# Patient Record
(demographics unavailable — no encounter records)

---

## 2025-03-04 NOTE — HISTORY OF PRESENT ILLNESS
[FreeTextEntry1] : Patient is an 82 year old F with history of CHF, CAD, HTN, was admitted to [facility] [place] last month, had ATN / CRS requiring hemodialysis. Patient was discharged on dialysis at [facility], with downtrending weekly Creatinines. Patient has now been off HD for a week, Cr stable 2.2. Permacath still in place. Labs to be done today. Being maintained on lasix 80mg daily at [facility] and taking sodium bicarb 650mg TID.

## 2025-03-04 NOTE — PHYSICAL EXAM
[General Appearance - Alert] : alert [General Appearance - In No Acute Distress] : in no acute distress [Neck Appearance] : the appearance of the neck was normal [Neck Cervical Mass (___cm)] : no neck mass was observed [Jugular Venous Distention Increased] : there was no jugular-venous distention [Thyroid Diffuse Enlargement] : the thyroid was not enlarged [Thyroid Nodule] : there were no palpable thyroid nodules [Auscultation Breath Sounds / Voice Sounds] : lungs were clear to auscultation bilaterally [Heart Rate And Rhythm] : heart rate was normal and rhythm regular [Heart Sounds] : normal S1 and S2 [Heart Sounds Gallop] : no gallops [Murmurs] : no murmurs [Heart Sounds Pericardial Friction Rub] : no pericardial rub [Full Pulse] : the pedal pulses are present [Edema] : there was no peripheral edema [FreeTextEntry1] : tunneled CVC RIJ permacath [Bowel Sounds] : normal bowel sounds [Abdomen Soft] : soft [Abdomen Tenderness] : non-tender [Abdomen Mass (___ Cm)] : no abdominal mass palpated [Cervical Lymph Nodes Enlarged Posterior Bilaterally] : posterior cervical [Cervical Lymph Nodes Enlarged Anterior Bilaterally] : anterior cervical [Supraclavicular Lymph Nodes Enlarged Bilaterally] : supraclavicular [Axillary Lymph Nodes Enlarged Bilaterally] : axillary [Femoral Lymph Nodes Enlarged Bilaterally] : femoral [Inguinal Lymph Nodes Enlarged Bilaterally] : inguinal [No CVA Tenderness] : no ~M costovertebral angle tenderness [No Spinal Tenderness] : no spinal tenderness [Abnormal Walk] : normal gait [Nail Clubbing] : no clubbing  or cyanosis of the fingernails [Musculoskeletal - Swelling] : no joint swelling seen [Motor Tone] : muscle strength and tone were normal [Skin Color & Pigmentation] : normal skin color and pigmentation [Skin Turgor] : normal skin turgor [] : no rash [Deep Tendon Reflexes (DTR)] : deep tendon reflexes were 2+ and symmetric [Sensation] : the sensory exam was normal to light touch and pinprick [No Focal Deficits] : no focal deficits [Oriented To Time, Place, And Person] : oriented to person, place, and time [Impaired Insight] : insight and judgment were intact [Affect] : the affect was normal

## 2025-03-04 NOTE — ASSESSMENT
[FreeTextEntry1] : 1) ATN 2) Cardiorenal syndrome 3) CKD IV 4) HTN  Maintain on lasix 80mg daily Maintain on sodium bicarb 650mg TID Will get labs today RTC 2 weeks with Penny Shields NP

## 2025-03-14 NOTE — PROCEDURE
[FreeTextEntry1] : right IJ perm cath removal  [FreeTextEntry2] : right IJ perm cath removal  [FreeTextEntry3] :  perm cath removal:  pt brought into exam room. Explained procedure and got written and verbal consent. Right chest prepped and draped in normal sterile fashion. 5 mL of 1% lidocaine injected around cuff of perm cath. Sutures removed from perm cath. Perm cath removed with manual traction. Pressure held for 5 minutes. Sterile gauze and tegaderm applied to site of perm cath. Pt tolerated procedure well.

## 2025-04-10 NOTE — HISTORY OF PRESENT ILLNESS
[FreeTextEntry1] : Patient is an 82 year old F with history of CHF, CAD, HTN, was [place] [facility] [place], had ATN / CRS requiring hemodialysis. Patient was discharged on dialysis at [facility], with downtrending weekly Creatinines. Patient has now been off HD for a week, Cr stable 2.2. Permacath still in place. Labs to be done today. Being maintained on lasix 80mg daily at [facility] and taking sodium bicarb 650mg TID.  Current: Pt seen/examined; hypotensive in office ; dry mucus membranes; labs reviewed BUN 48-->129; Cr went from 2 to 3.2; stopped lasix and entresto yesterday; was refusing ER

## 2025-04-10 NOTE — PHYSICAL EXAM
[General Appearance - Alert] : alert [General Appearance - In No Acute Distress] : in no acute distress [Neck Appearance] : the appearance of the neck was normal [Neck Cervical Mass (___cm)] : no neck mass was observed [Jugular Venous Distention Increased] : there was no jugular-venous distention [Thyroid Diffuse Enlargement] : the thyroid was not enlarged [Thyroid Nodule] : there were no palpable thyroid nodules [Auscultation Breath Sounds / Voice Sounds] : lungs were clear to auscultation bilaterally [Heart Rate And Rhythm] : heart rate was normal and rhythm regular [Heart Sounds] : normal S1 and S2 [Heart Sounds Gallop] : no gallops [Murmurs] : no murmurs [Heart Sounds Pericardial Friction Rub] : no pericardial rub [Full Pulse] : the pedal pulses are present [Edema] : there was no peripheral edema [Bowel Sounds] : normal bowel sounds [Abdomen Soft] : soft [Abdomen Tenderness] : non-tender [Abdomen Mass (___ Cm)] : no abdominal mass palpated [Cervical Lymph Nodes Enlarged Posterior Bilaterally] : posterior cervical [Cervical Lymph Nodes Enlarged Anterior Bilaterally] : anterior cervical [Supraclavicular Lymph Nodes Enlarged Bilaterally] : supraclavicular [Axillary Lymph Nodes Enlarged Bilaterally] : axillary [Femoral Lymph Nodes Enlarged Bilaterally] : femoral [Inguinal Lymph Nodes Enlarged Bilaterally] : inguinal [No CVA Tenderness] : no ~M costovertebral angle tenderness [No Spinal Tenderness] : no spinal tenderness [Abnormal Walk] : normal gait [Nail Clubbing] : no clubbing  or cyanosis of the fingernails [Musculoskeletal - Swelling] : no joint swelling seen [Motor Tone] : muscle strength and tone were normal [Skin Color & Pigmentation] : normal skin color and pigmentation [Skin Turgor] : normal skin turgor [] : no rash [Deep Tendon Reflexes (DTR)] : deep tendon reflexes were 2+ and symmetric [Sensation] : the sensory exam was normal to light touch and pinprick [No Focal Deficits] : no focal deficits [Oriented To Time, Place, And Person] : oriented to person, place, and time [Impaired Insight] : insight and judgment were intact [Affect] : the affect was normal [FreeTextEntry1] : tunneled CVC RIJ permacath

## 2025-04-10 NOTE — ASSESSMENT
[FreeTextEntry1] : 1) ATN 2) Cardiorenal syndrome 3) CKD IV 4) HTN  Convinced patient to go to ER Hypotensive; Needs IVF Signed out to Penny Shields NP who is at hospital Gave note to  to give to ER doc  RTC 2 weeks

## 2025-04-28 NOTE — DISCUSSION/SUMMARY
[Patient] : the patient [With Me] : with me [___ Month(s)] : in [unfilled] month(s) [FreeTextEntry1] : 82-year-old female past medical history of above presenting for hospital follow-up.  Worsening renal function as well as anemia.  Following up with nephrology and PMD.  GI did not recommend any intervention.  1. CAD - s/p PCI to the LAD complicated by distal vessel dissection - healed.  No chest pain. 2. HTN -now hypotensive.  Asymptomatic.  No longer on Lasix or Entresto.  Metoprolol was changed to Coreg 3.125 twice daily.  Continue same. 3. Palpitations - NSVT and PAT -continue beta-blocker. 4. Smoking cessation advised.   5. Cardiac rehab referral will be given by Dr. Shrestha.  6. PAD - aortic aneurysm -status postrepair.  SFA stenosis - Maini evaluation for revasc.  7.  CKD: Continue follow-up with nephrology. 8.  Anemia: History of large untreated polyp as per hospital records.  Continue follow-up with GI.  RTC 2-3 months

## 2025-04-28 NOTE — HISTORY OF PRESENT ILLNESS
[FreeTextEntry1] : 82F w/ PMH of HTN, HLD, CAD s/p cath with diagonal disease (too small to fix), lung CA, breast CA, active smoker and palpitations presenting to re-establish care.  She had SARSCOV2 along with her  and has felt pretty much back to normal.  She experiences left-sided chest pain/heaviness, rated 6/10 and lasting for minutes at a time. She has also been experiencing palpitations.   8/9/2024: She reports a possible pancreatic mass (I do not have the results of her [place] [facility] testing).  Stable SOB, cancelled her vascular cardiology appts and studies.   4/25/25: Presents today for follow-up from recent hospital admission.  Was seen at her nephrologist office and found to be hypotensive at 72/48 mmHg.  There was also a significant increase in her renal function.  BUN increased from .  Creatinine went from 2-3.2.  Patient had discontinued Lasix and Entresto the day prior.  Upon presentation to ED, patient's H&H was 6.1 and 20.9 respectively.  She was transfused 2 units and given Retacrit.  H&H improved.  Evaluated by GI with no recommended interventions at that time.  Has history of "Large polyp".  CT scan was not done and follow-up with GI advised.  Entresto and Lasix were held and not resumed on discharge.  Metoprolol was changed to Coreg.  She was evaluated by palliative care.  DNR and DNI signed.  Patient was asymptomatic and subsequently discharged home.  No hospice.  Prior to discharge, patient's BUN improved to 74 and creatinine to 1.7.  Notes that she had blood work 2 days ago with her PMD.  These will be requested. She is seen today in a wheelchair.  Overall feels well but weak with continued but improving dyspnea on exertion..  She is requesting cardiac rehab.  Denies any current chest pain.  No edema PND or orthopnea.

## 2025-04-28 NOTE — PHYSICAL EXAM
[Well Developed] : well developed [No Acute Distress] : no acute distress [Frail] : frail [Normal Conjunctiva] : normal conjunctiva [Normal Venous Pressure] : normal venous pressure [No Carotid Bruit] : no carotid bruit [Normal S1, S2] : normal S1, S2 [No Murmur] : no murmur [No Rub] : no rub [No Gallop] : no gallop [No Respiratory Distress] : no respiratory distress  [Soft] : abdomen soft [Non Tender] : non-tender [No Masses/organomegaly] : no masses/organomegaly [Normal Bowel Sounds] : normal bowel sounds [Abnormal Gait] : abnormal gait [No Edema] : no edema [No Cyanosis] : no cyanosis [No Clubbing] : no clubbing [No Varicosities] : no varicosities [No Rash] : no rash [No Skin Lesions] : no skin lesions [Moves all extremities] : moves all extremities [No Focal Deficits] : no focal deficits [Normal Speech] : normal speech [Alert and Oriented] : alert and oriented [Normal memory] : normal memory [de-identified] : Poor airy entry, rhonchi [de-identified] : broken foot, in wheelchair [de-identified] : poor distal pulses

## 2025-04-28 NOTE — CARDIOLOGY SUMMARY
[de-identified] : 3/26/21: SR, LVH, ST changes associated with LVH\par  1/10/22: SR, NSST, LVH\par  9/26/2022: Sinus rhythm, LVH, NSST\par  4/28/2023: SR, PVC, LVH, NSST, unchanged\par  6/12/2023: SR, LVH, NSST [de-identified] : 3/26/21: EF 50-55%, minimal MR, trace AI, severely dilated LA, hypokinesis of the basal inferior wall, basal inferolateral wall and basal lateral wall, mild concentric LVH, mild PI, mild pHTN. \par  10/11/22: Ef reduced, left atrial enlargement.  [de-identified] : 4/1/21: mLAD 0% stenosis of prior stent, distal LAD 70%, healed dissection, D1, 90% stenosis in small vessel.

## 2025-04-28 NOTE — CARDIOLOGY SUMMARY
[de-identified] : 3/26/21: SR, LVH, ST changes associated with LVH\par  1/10/22: SR, NSST, LVH\par  9/26/2022: Sinus rhythm, LVH, NSST\par  4/28/2023: SR, PVC, LVH, NSST, unchanged\par  6/12/2023: SR, LVH, NSST [de-identified] : 3/26/21: EF 50-55%, minimal MR, trace AI, severely dilated LA, hypokinesis of the basal inferior wall, basal inferolateral wall and basal lateral wall, mild concentric LVH, mild PI, mild pHTN. \par  10/11/22: Ef reduced, left atrial enlargement.  [de-identified] : 4/1/21: mLAD 0% stenosis of prior stent, distal LAD 70%, healed dissection, D1, 90% stenosis in small vessel.

## 2025-04-28 NOTE — CARDIOLOGY SUMMARY
[de-identified] : 3/26/21: SR, LVH, ST changes associated with LVH\par  1/10/22: SR, NSST, LVH\par  9/26/2022: Sinus rhythm, LVH, NSST\par  4/28/2023: SR, PVC, LVH, NSST, unchanged\par  6/12/2023: SR, LVH, NSST [de-identified] : 3/26/21: EF 50-55%, minimal MR, trace AI, severely dilated LA, hypokinesis of the basal inferior wall, basal inferolateral wall and basal lateral wall, mild concentric LVH, mild PI, mild pHTN. \par  10/11/22: Ef reduced, left atrial enlargement.  [de-identified] : 4/1/21: mLAD 0% stenosis of prior stent, distal LAD 70%, healed dissection, D1, 90% stenosis in small vessel.

## 2025-04-28 NOTE — PHYSICAL EXAM
[Well Developed] : well developed [No Acute Distress] : no acute distress [Frail] : frail [Normal Conjunctiva] : normal conjunctiva [Normal Venous Pressure] : normal venous pressure [No Carotid Bruit] : no carotid bruit [Normal S1, S2] : normal S1, S2 [No Murmur] : no murmur [No Rub] : no rub [No Gallop] : no gallop [No Respiratory Distress] : no respiratory distress  [Soft] : abdomen soft [Non Tender] : non-tender [No Masses/organomegaly] : no masses/organomegaly [Normal Bowel Sounds] : normal bowel sounds [Abnormal Gait] : abnormal gait [No Edema] : no edema [No Cyanosis] : no cyanosis [No Clubbing] : no clubbing [No Varicosities] : no varicosities [No Rash] : no rash [No Skin Lesions] : no skin lesions [Moves all extremities] : moves all extremities [No Focal Deficits] : no focal deficits [Normal Speech] : normal speech [Alert and Oriented] : alert and oriented [Normal memory] : normal memory [de-identified] : Poor airy entry, rhonchi [de-identified] : broken foot, in wheelchair [de-identified] : poor distal pulses

## 2025-04-28 NOTE — REVIEW OF SYSTEMS
[Feeling Fatigued] : feeling fatigued [SOB] : shortness of breath [Dyspnea on exertion] : dyspnea during exertion [Leg Claudication] : intermittent leg claudication [Joint Pain] : joint pain [Negative] : Heme/Lymph [Chest Discomfort] : no chest discomfort [Cough] : no cough

## 2025-04-28 NOTE — PHYSICAL EXAM
[Well Developed] : well developed [No Acute Distress] : no acute distress [Frail] : frail [Normal Conjunctiva] : normal conjunctiva [Normal Venous Pressure] : normal venous pressure [No Carotid Bruit] : no carotid bruit [Normal S1, S2] : normal S1, S2 [No Murmur] : no murmur [No Rub] : no rub [No Gallop] : no gallop [No Respiratory Distress] : no respiratory distress  [Soft] : abdomen soft [Non Tender] : non-tender [No Masses/organomegaly] : no masses/organomegaly [Normal Bowel Sounds] : normal bowel sounds [Abnormal Gait] : abnormal gait [No Edema] : no edema [No Cyanosis] : no cyanosis [No Clubbing] : no clubbing [No Varicosities] : no varicosities [No Rash] : no rash [No Skin Lesions] : no skin lesions [Moves all extremities] : moves all extremities [No Focal Deficits] : no focal deficits [Normal Speech] : normal speech [Alert and Oriented] : alert and oriented [Normal memory] : normal memory [de-identified] : Poor airy entry, rhonchi [de-identified] : broken foot, in wheelchair [de-identified] : poor distal pulses

## 2025-04-28 NOTE — REASON FOR VISIT
[Hyperlipidemia] : hyperlipidemia [Hypertension] : hypertension [Coronary Artery Disease] : coronary artery disease [Spouse] : spouse [FreeTextEntry3] : Dr. Davidson

## 2025-04-30 NOTE — ASSESSMENT
[FreeTextEntry1] : 1) ATN 2) Cardiorenal syndrome 3) CKD IV 4) HTN   Pt with a prerenal RUDDY on CKD IV;  SCr returned to pt baseline, 2.25;  SBP soft 90s, clinically looks dry, advised to reduce lasix dosage;  Pt currently taking lasix 20mg po qd, changed to every other day;  Renal panel today;  RTO 1 month;

## 2025-04-30 NOTE — HISTORY OF PRESENT ILLNESS
[FreeTextEntry1] : Patient is an 82 year old F with history of CHF, CAD, HTN, was admitted to [facility] last month, had ATN / CRS requiring hemodialysis. Patient was discharged on dialysis at [facility], with downtrending weekly Creatinines. Patient has now been off HD for a week, Cr stable 2.2. Permacath still in place. Labs to be done today. Being maintained on lasix 80mg daily at [facility] and taking sodium bicarb 650mg TID.  Current: Pt seen/examined; hypotensive in office ; dry mucus membranes; labs reviewed BUN 48-->129; Cr went from 2 to 3.2; stopped lasix and entresto yesterday; was refusing ER  Current: pt seen/examined; soft BP in office, SBP 90s, dry mucus membranes, shriveled legs, BUN/Cr returned to baseline at stopping lasix and entresto, now 52.3/2.25; no need for HD, renal indices are stable;

## 2025-06-04 NOTE — HISTORY OF PRESENT ILLNESS
[FreeTextEntry1] : Patient is an 82 year old F with history of CHF, CAD, HTN, was admitted to [facility] last month, had ATN / CRS requiring hemodialysis. Patient was discharged on dialysis at [facility], with downtrending weekly Creatinine. Patient has now been off HD for a week, Cr stable 2.2. Permacath still in place. Labs to be done today. Being maintained on lasix 80mg daily at [facility] and taking sodium bicarb 650mg TID.  Current: Pt seen/examined; hypotensive in office; dry mucus membranes; labs reviewed BUN 48-->129; Cr went from 2 to 3.2; stopped lasix and entresto yesterday; was refusing ER  4/30/25: pt seen/examined; soft BP in office, SBP 90s, dry mucus membranes, shriveled legs, BUN/Cr returned to baseline at stopping lasix and entresto, now 52.3/2.25; no need for HD, renal indices are stable;   Current: pt seen/examined; pt unsure of all medications, pt  endorses self changing medication regimen for pt. pt is concerned because she is having the continued itching- worried about it being related to kidney issues. Will send labs and have pt return next week to do full medication review, and help pt and  set her daily pill box; Labs BUN/Scr 39/1.83, will send today, however seems unlikely itching is related to uremia;

## 2025-06-04 NOTE — ASSESSMENT
[FreeTextEntry1] : 1) ATN 2) Cardiorenal syndrome 3) CKD IV 4) HTN   Pt with a prerenal RUDDY on CKD IV;  Last BUN/SCr 39/1.83;  Unclear of current pt regimen;  Pt will return to office next week for full medication review;  Send renal panel, urine studies today;  Pt states UOP has significantly reduced, however  has started holding her lasix for approx 1 week; RTO 1 week;

## 2025-06-18 NOTE — PHYSICAL EXAM
[General Appearance - Alert] : alert [General Appearance - In No Acute Distress] : in no acute distress [Neck Appearance] : the appearance of the neck was normal [Neck Cervical Mass (___cm)] : no neck mass was observed [Jugular Venous Distention Increased] : there was no jugular-venous distention [Thyroid Diffuse Enlargement] : the thyroid was not enlarged [Thyroid Nodule] : there were no palpable thyroid nodules [Auscultation Breath Sounds / Voice Sounds] : lungs were clear to auscultation bilaterally [Heart Rate And Rhythm] : heart rate was normal and rhythm regular [Heart Sounds] : normal S1 and S2 [Murmurs] : no murmurs [Heart Sounds Gallop] : no gallops [Heart Sounds Pericardial Friction Rub] : no pericardial rub [Full Pulse] : the pedal pulses are present [Edema] : there was no peripheral edema [FreeTextEntry1] : tunneled CVC RIJ permacath [Bowel Sounds] : normal bowel sounds [Abdomen Soft] : soft [Abdomen Tenderness] : non-tender [Abdomen Mass (___ Cm)] : no abdominal mass palpated [Cervical Lymph Nodes Enlarged Posterior Bilaterally] : posterior cervical [Cervical Lymph Nodes Enlarged Anterior Bilaterally] : anterior cervical [Supraclavicular Lymph Nodes Enlarged Bilaterally] : supraclavicular [Axillary Lymph Nodes Enlarged Bilaterally] : axillary [Femoral Lymph Nodes Enlarged Bilaterally] : femoral [Inguinal Lymph Nodes Enlarged Bilaterally] : inguinal [No CVA Tenderness] : no ~M costovertebral angle tenderness [No Spinal Tenderness] : no spinal tenderness [Abnormal Walk] : normal gait [Nail Clubbing] : no clubbing  or cyanosis of the fingernails [Musculoskeletal - Swelling] : no joint swelling seen [Motor Tone] : muscle strength and tone were normal [Skin Color & Pigmentation] : normal skin color and pigmentation [Skin Turgor] : normal skin turgor [] : no rash [Deep Tendon Reflexes (DTR)] : deep tendon reflexes were 2+ and symmetric [Sensation] : the sensory exam was normal to light touch and pinprick [No Focal Deficits] : no focal deficits [Oriented To Time, Place, And Person] : oriented to person, place, and time [Impaired Insight] : insight and judgment were intact [Affect] : the affect was normal

## 2025-06-18 NOTE — ASSESSMENT
[FreeTextEntry1] : 1) ATN 2) Cardiorenal syndrome 3) CKD IV 4) HTN   Pt with a prerenal RUDDY on CKD IV;  Last BUN/SCr 46/1.77;  Pt currently on coreg 3.125mg ONLY Tues, Thurs, Sat, Sun Imdur 60mg po qd;  Flomax 0.4mg po BID;  Urines sent today   RTO 1 month;

## 2025-06-18 NOTE — HISTORY OF PRESENT ILLNESS
[FreeTextEntry1] : Patient is an 82 year old F with history of CHF, CAD, HTN, was admitted to [facility] last month, had ATN / CRS requiring hemodialysis. Patient was discharged on dialysis at [facility], with downtrending weekly Creatinine. Patient has now been off HD for a week, Cr stable 2.2. Permacath still in place. Labs to be done today. Being maintained on lasix 80mg daily at [facility] and taking sodium bicarb 650mg TID.  Current: Pt seen/examined; hypotensive in office; dry mucus membranes; labs reviewed BUN 48-->129; Cr went from 2 to 3.2; stopped lasix and entresto yesterday; was refusing ER  4/30/25: pt seen/examined; soft BP in office, SBP 90s, dry mucus membranes, shriveled legs, BUN/Cr returned to baseline at stopping lasix and entresto, now 52.3/2.25; no need for HD, renal indices are stable;  pt seen/examined; pt unsure of all medications, pt  endorses self-changing medication regimen for pt. pt is concerned because she is having the continued itching- worried about it being related to kidney issues. Will send labs and have pt return next week to do full medication review, and help pt and  set her daily pill box; Labs BUN/Scr 39/1.83, will send today, however seems unlikely itching is related to uremia;   Current: pt seen/examined; pt feels well, pt brought her most updated medication list, helped pt and  update and make a more organized med list. Labs from last week BUN 46/1.77, stable;